# Patient Record
Sex: MALE | Race: WHITE
[De-identification: names, ages, dates, MRNs, and addresses within clinical notes are randomized per-mention and may not be internally consistent; named-entity substitution may affect disease eponyms.]

---

## 2018-05-17 ENCOUNTER — HOSPITAL ENCOUNTER (OUTPATIENT)
Dept: HOSPITAL 58 - RHC-LAB | Age: 48
Discharge: HOME | End: 2018-05-17
Attending: INTERNAL MEDICINE

## 2018-05-17 DIAGNOSIS — E78.5: Primary | ICD-10-CM

## 2018-05-17 DIAGNOSIS — Z12.5: ICD-10-CM

## 2018-05-17 DIAGNOSIS — I10: ICD-10-CM

## 2018-05-17 DIAGNOSIS — E03.9: ICD-10-CM

## 2018-05-17 PROCEDURE — 36415 COLL VENOUS BLD VENIPUNCTURE: CPT

## 2018-05-17 PROCEDURE — 85025 COMPLETE CBC W/AUTO DIFF WBC: CPT

## 2018-05-17 PROCEDURE — 84443 ASSAY THYROID STIM HORMONE: CPT

## 2018-05-17 PROCEDURE — 80061 LIPID PANEL: CPT

## 2018-05-17 PROCEDURE — 80053 COMPREHEN METABOLIC PANEL: CPT

## 2019-09-01 ENCOUNTER — HOSPITAL ENCOUNTER (EMERGENCY)
Dept: HOSPITAL 58 - ED | Age: 49
Discharge: HOME | End: 2019-09-01

## 2019-09-01 VITALS — SYSTOLIC BLOOD PRESSURE: 123 MMHG | DIASTOLIC BLOOD PRESSURE: 82 MMHG | TEMPERATURE: 98.3 F

## 2019-09-01 VITALS — BODY MASS INDEX: 41 KG/M2

## 2019-09-01 DIAGNOSIS — J18.9: Primary | ICD-10-CM

## 2019-09-01 PROCEDURE — 99282 EMERGENCY DEPT VISIT SF MDM: CPT

## 2019-09-01 NOTE — ED.PDOC
General


ED Provider: 


Dr. KEVIN ROTHMAN-ER





Chief Complaint: Cough


Stated Complaint: jeane got a cough bringing up some yellow sputum--denies sob or 

chest pain


Time Seen by Physician: 10:54


Mode of Arrival: Walk-In


Information Source: Patient


Exam Limitations: No limitations


Primary Care Provider: 


AIDA HDZ





Nursing and Triage Documentation Reviewed and Agree: Yes


Does patient meet sepsis criteria?: No


System Inflammatory Response Syndrome: Not Applicable


Sepsis Protocol: 


For patient's 13 years and over:





Temp is 96.8 and below  and greater


Pulse >90 BPM


Resp >20/minute


Acutely Altered Mental Status





Are patient's symptoms suggestive of a new infection, such as:


   -Pneumonia


   -Skin, Soft Tissue


   -Endocarditis


   -UTI


   -Bone, Joint Infection


   -Implantable Device


   -Acute Abdominal Infection


   -Wound Infection


   -Meningitis


   -Blood Stream Catheter Infection


   -Unknown








Respiratory Complaint Exam





- Respiratory Complaint/Exam


Onset/Duration: 3 days


Symptoms Are: Still present


Timing: Intermittent


Initial Severity: Mild


Current Severity: Mild


Location: Chest


Character: Reports: Productive cough


Aggravating: Reports: URI


Alleviating: Reports: Spontaneous resolution


Associated Signs and Symptoms: Reports: Fever, URI.  Denies: Rapid breathing, 

Dyspnea, Chills, Chest pain, Pleuritic chest pain, Wheezing, Hemoptysis, 

Dizziness, Calf pain, Calf swelling, Edema, Vomiting, Sore throat, Weight loss


Home Oxygen Use: No


Recent Stress Test: No


Recent Echo/LV Function: No


Current Antibiotic Use: No


Current Asthma Medication Use: No


Respiratory Distress: None


Inadequate Respiratory Effort: No


Dysphagia Present: No


Stridor Present: No


JVD Present: No


Retractions: Not Present


Diminished Breath Sounds: No


Sinus Tenderness: None


Grunting Respirations: No


Kussmaul Respirations: No


Differential Diagnoses: Pneumonia, Bronchitis, URI





Review of Systems





- Review Of Systems


Constitutional: Reports: Fever


Eyes: Reports: No symptoms


Ears, Nose, Mouth, Throat: Reports: No symptoms


Respiratory: Reports: Cough.  Denies: Short of air, Stridor, Wheezing


Cardiac: Reports: No symptoms


GI: Reports: No symptoms


: Reports: No symptoms


Musculoskeletal: Reports: No symptoms


Skin: Reports: No symptoms


Neurological: Reports: No symptoms


Endocrine: Reports: No symptoms


Hematologic/Lymphatic: Reports: No symptoms


All Other Systems: Reviewed and Negative





Past Medical History





- Past Medical History


Previously Healthy: Yes


Endocrine: Reports: Unknown


Cardiovascular: Reports: Unknown


Respiratory: Reports: Unknown


Hematological: Reports: Unknown


Gastrointestinal: Reports: Unknown


Genitourinary: Reports: Unknown


Neuro/Psych: Reports: Unknown


Musculoskeletal: Reports: Unknown


Cancer: Reports: Unknown





- Surgical History


General Surgical History: Reports: Unknown





- Family History


Family History: Reports: Unknown





- Social History


Smoking Status: Former smoker


Hx Substance Use: No


Alcohol Screening: Occasionally





- Immunizations


Tetanus Shot up to Date: No





Physical Exam





- Physical Exam


Appearance: Well-appearing, No pain distress, Well-nourished


Eyes: HANNAH, EOMI, Conjunctiva clear


ENT: Ears normal, Nose normal, Oropharynx normal


Neck: Supple


Respiratory: Crackles


Cardiovascular: RRR, Pulses normal, No rub, No murmur


GI/: Soft, Nontender, No masses, Bowel sounds normal, No Organomegaly


Musculoskeletal: Normal strength, ROM intact, No edema, No calf tenderness


Skin: Warm


Neurological: Sensation intact, Alert, Oriented


Psychiatric: Affect appropriate, Mood appropriate





Critical Care Note





- Critical Care Note


Total Time (mins): 0





Course





- Course


Vital Signs: 





 











  Temp Pulse Resp BP Pulse Ox


 


 09/01/19 10:39  98.3 F  84  18  123/82  95














Departure





- Departure


Time of Disposition: 10:55


Disposition: HOME SELF-CARE


Discharge Problem: 


Community acquired pneumonia


Qualifiers:


 Laterality: unspecified laterality Qualified Code(s): J18.9 - Pneumonia, 

unspecified organism





Instructions:  Community Acquired Pneumonia (ED)


Condition: Good


Pt referred to PMD for follow-up: Yes


IPMP verified?: No


Additional Instructions: 


f/u with pcp if not improving in 72hrs


Allergies/Adverse Reactions: 


Allergies





lisinopril Allergy (Severe, Verified 09/01/19 10:49)


 queazy, head hurt


 Patient will notify drugstore 








Home Medications: 


Ambulatory Orders





1 [No Reported Medications]  09/01/19 








Disposition Discussed With: Patient